# Patient Record
Sex: FEMALE | Race: WHITE | NOT HISPANIC OR LATINO | Employment: PART TIME | URBAN - NONMETROPOLITAN AREA
[De-identification: names, ages, dates, MRNs, and addresses within clinical notes are randomized per-mention and may not be internally consistent; named-entity substitution may affect disease eponyms.]

---

## 2023-01-31 ENCOUNTER — OFFICE VISIT (OUTPATIENT)
Dept: FAMILY MEDICINE CLINIC | Facility: CLINIC | Age: 18
End: 2023-01-31
Payer: COMMERCIAL

## 2023-01-31 VITALS
HEART RATE: 78 BPM | DIASTOLIC BLOOD PRESSURE: 68 MMHG | WEIGHT: 137 LBS | OXYGEN SATURATION: 99 % | SYSTOLIC BLOOD PRESSURE: 110 MMHG | HEIGHT: 66 IN | BODY MASS INDEX: 22.02 KG/M2 | TEMPERATURE: 97.8 F

## 2023-01-31 DIAGNOSIS — R10.13 EPIGASTRIC PAIN: ICD-10-CM

## 2023-01-31 DIAGNOSIS — Z87.440 HISTORY OF RECURRENT UTI (URINARY TRACT INFECTION): ICD-10-CM

## 2023-01-31 DIAGNOSIS — F41.9 ANXIETY: ICD-10-CM

## 2023-01-31 DIAGNOSIS — K59.04 CHRONIC IDIOPATHIC CONSTIPATION: Primary | ICD-10-CM

## 2023-01-31 DIAGNOSIS — Z78.9 ELECTRONIC CIGARETTE USE: ICD-10-CM

## 2023-01-31 PROCEDURE — 99204 OFFICE O/P NEW MOD 45 MIN: CPT | Performed by: FAMILY MEDICINE

## 2023-01-31 RX ORDER — VILAZODONE HYDROCHLORIDE 20 MG/1
TABLET ORAL
COMMUNITY
Start: 2023-01-13 | End: 2023-01-31 | Stop reason: SDUPTHER

## 2023-01-31 RX ORDER — NORETHINDRONE ACETATE AND ETHINYL ESTRADIOL, AND FERROUS FUMARATE 1MG-20(24)
1 KIT ORAL DAILY
COMMUNITY
Start: 2023-01-09 | End: 2023-02-09 | Stop reason: SDUPTHER

## 2023-01-31 RX ORDER — OMEPRAZOLE 20 MG/1
20 CAPSULE, DELAYED RELEASE ORAL DAILY
COMMUNITY
End: 2023-01-31 | Stop reason: SDUPTHER

## 2023-01-31 RX ORDER — VILAZODONE HYDROCHLORIDE 10 MG/1
TABLET ORAL
COMMUNITY
Start: 2022-10-28 | End: 2023-01-31 | Stop reason: SDUPTHER

## 2023-01-31 RX ORDER — POLYETHYLENE GLYCOL 3350 17 G/17G
17 POWDER, FOR SOLUTION ORAL DAILY
Qty: 850 G | Refills: 11 | Status: SHIPPED | OUTPATIENT
Start: 2023-01-31

## 2023-01-31 RX ORDER — VILAZODONE HYDROCHLORIDE 20 MG/1
20 TABLET ORAL DAILY
Qty: 30 TABLET | Refills: 11
Start: 2023-01-31

## 2023-01-31 RX ORDER — OMEPRAZOLE 40 MG/1
40 CAPSULE, DELAYED RELEASE ORAL 2 TIMES DAILY
Qty: 60 CAPSULE | Refills: 3 | Status: SHIPPED | OUTPATIENT
Start: 2023-01-31 | End: 2023-02-02 | Stop reason: CLARIF

## 2023-01-31 NOTE — PROGRESS NOTES
Follow Up Office Visit      Date: 2023   Patient Name: Janice Landaverde  : 2005   MRN: [id number]     Chief Complaint:    Chief Complaint   Patient presents with   • Abdominal Pain     Upper abdomen   • Nausea   • Constipation   • Establish Care       History of Present Illness: Janice Landaverde is a 17 y.o. female who is here today for complaint of having abdominal pain.  Patient states that she is having problems with epigastric abdominal pain that is preventing her from eating.  She has had some problems with vomiting in the past but this was induced to help with the sensation of her epigastric pain.  The pain does become worse several hours after she eats but is not associated with any right upper quadrant pain or difficulty with her right shoulder blade pain.  Patient has had problems with frequent urinary tract infections in the past.  She also has a history of having constipation in which she was taking MiraLAX in the past.  Patient had a good bowel movement this morning that was hard.  It did not necessarily decrease her epigastric symptomatology.  She has no heartburn symptoms.  She does not have pain that radiates to her back.  She has not had any blood in her stool or in her emesis.  She has tried over-the-counter acid reducers with minimal success.  No other issues have been no at present time except for patient being seen by gynecologist for frequent urinary tract infections.  He does otherwise continue with normal activity, appetite and sleep up to this point.  Since she has had the abdominal pain her sleep has been disrupted and her appetite and activity have been diminished.  She has no other cardiovascular, respiratory, gastrointestinal, urologic or neurologic symptomatology.  Patient has no gynecologic symptoms.  Her periods have been doing well as she has no pelvic complaints.    Subjective      Review of Systems:   Review of Systems   Constitutional: Negative for activity change,  appetite change and fatigue.   Respiratory: Negative for cough and shortness of breath.    Cardiovascular: Negative for chest pain, palpitations and leg swelling.   Gastrointestinal: Positive for abdominal pain and constipation. Negative for blood in stool, diarrhea, nausea and vomiting.   Genitourinary: Negative for dysuria, flank pain, frequency and urgency.   Neurological: Negative for dizziness, weakness and memory problem.   Psychiatric/Behavioral: Positive for sleep disturbance.       I have reviewed the patients family history, social history, past medical history, past surgical history and have updated it as appropriate.     Medications:     Current Outpatient Medications:   •  Gemmily 1-20 MG-MCG(24) capsule, Take 1 capsule by mouth Daily., Disp: , Rfl:   •  omeprazole (priLOSEC) 40 MG capsule, Take 1 capsule by mouth 2 (Two) Times a Day. PRN, Disp: 60 capsule, Rfl: 3  •  vilazodone (VIIBRYD) 20 MG tablet tablet, Take 1 tablet by mouth Daily., Disp: 30 tablet, Rfl: 11  •  polyethylene glycol (MiraLax) 17 GM/SCOOP powder, Take 17 g by mouth Daily., Disp: 850 g, Rfl: 11    Allergies:   No Known Allergies    Immunizations:   Immunization History   Administered Date(s) Administered   • DTaP, Unspecified 2003, 2003, 03/01/2004, 12/02/2004, 05/21/2008   • Flu Vaccine Quad PF 6-35MO 12/09/2009   • Hep A, 2 Dose 08/04/2014, 08/07/2018   • Hep B, Adolescent or Pediatric 2003, 2003, 2003, 03/01/2004   • Hib (HbOC) 2003, 2003, 08/17/2004   • Hpv9 08/07/2018, 10/16/2019   • IPV 2003, 2003, 03/01/2004, 05/21/2008   • MMR 12/02/2004, 05/21/2008   • Meningococcal Conjugate 08/04/2014   • Meningococcal MCV4P (Menactra) 10/16/2019   • Pneumococcal Conjugate 13-Valent (PCV13) 2003, 2003, 03/01/2004, 12/02/2004   • Tdap 08/04/2014, 08/07/2018   • Varicella 08/17/2004, 08/04/2014        Objective     Physical Exam: Please see above  Vital Signs:   Vitals:     "01/31/23 1354   BP: 110/68   BP Location: Left arm   Patient Position: Sitting   Cuff Size: Adult   Pulse: 78   Temp: 97.8 °F (36.6 °C)   SpO2: 99%   Weight: 62.1 kg (137 lb)   Height: 167.6 cm (66\")   PainSc:   4   PainLoc: Abdomen     Body mass index is 22.11 kg/m².  BMI is within normal parameters. No other follow-up for BMI required.       Physical Exam  Vitals and nursing note reviewed.   Constitutional:       Appearance: Normal appearance.   HENT:      Head: Normocephalic and atraumatic.      Nose: Nose normal.      Mouth/Throat:      Pharynx: Oropharynx is clear.   Eyes:      Extraocular Movements: Extraocular movements intact.      Pupils: Pupils are equal, round, and reactive to light.   Neck:      Thyroid: No thyroid mass or thyromegaly.      Trachea: Trachea normal.   Cardiovascular:      Rate and Rhythm: Normal rate and regular rhythm.      Pulses: Normal pulses. No decreased pulses.      Heart sounds: Normal heart sounds.   Pulmonary:      Effort: Pulmonary effort is normal.      Breath sounds: Normal breath sounds.   Abdominal:      General: Abdomen is flat. Bowel sounds are normal.      Palpations: Abdomen is soft. There is no hepatomegaly, splenomegaly, mass or pulsatile mass.      Tenderness: There is no abdominal tenderness. There is no right CVA tenderness, left CVA tenderness, guarding or rebound. Negative signs include Perkins's sign and McBurney's sign.      Hernia: No hernia is present.      Comments: Patient had dullness to percussion up to her splenic flexure.   Musculoskeletal:      Cervical back: Neck supple.      Right lower leg: No edema.      Left lower leg: No edema.   Lymphadenopathy:      Cervical: No cervical adenopathy.   Skin:     General: Skin is warm and dry.   Neurological:      General: No focal deficit present.      Mental Status: She is alert and oriented to person, place, and time.      Sensory: Sensation is intact.      Motor: Motor function is intact.      Coordination: " Coordination is intact.   Psychiatric:         Attention and Perception: Attention normal.         Mood and Affect: Mood normal.         Speech: Speech normal.         Behavior: Behavior normal.         Procedures    Results:   Labs:   No results found for: HGBA1C, CMP, CBCDIFFPANEL, CREAT, TSH     POCT Results (if applicable):   No results found for this or any previous visit.    Imaging:   No valid procedures specified.     Measures:   Advanced Care Planning:   Did not discuss.    Smoking Cessation:   Patient does not smoke but does use electronic cigarettes.    Assessment / Plan      Assessment/Plan:   Diagnoses and all orders for this visit:    1. Chronic idiopathic constipation (Primary)   Patient's exam is more consistent with constipation.  I do suspect that her pain could be a result of having constipation.  She does have a history of having constipation in the past and did use MiraLAX at that time.  We will restart her on MiraLAX and reassess in 2 weeks time.  If symptoms do persist it may be necessary for us to pursue with further evaluation and treatment with even a possible colonoscopy.  -     polyethylene glycol (MiraLax) 17 GM/SCOOP powder; Take 17 g by mouth Daily.  Dispense: 850 g; Refill: 11    2. Epigastric pain   Patient has epigastric pain I do believe is a result of constipation.  She does not have findings consistent with gallbladder disease or gastritis.  But nonetheless that she has had some reflux symptoms and worsening symptoms at bedtime we will go ahead and try her on a PPI twice daily just to assure as there is no underlying abnormality.  If she develops alarm symptoms that may be necessary for us to further pursue.  If her symptoms persist despite treatment we may even assess for afebrile H. pylori infection or celiac disease.  -     omeprazole (priLOSEC) 40 MG capsule; Take 1 capsule by mouth 2 (Two) Times a Day. PRN  Dispense: 60 capsule; Refill: 3    3. Anxiety   Patient has been  taking Viibryd.  Jennifer Lay OMAR.  She has been doing well with this.  We will continue on her current regimen.  -     vilazodone (VIIBRYD) 20 MG tablet tablet; Take 1 tablet by mouth Daily.  Dispense: 30 tablet; Refill: 11    4. Electronic cigarette use   We did not discuss electronic cigarette usage at this visit.  We will discuss next time advising her to discontinue due to the health complications.    5. History of recurrent UTI (urinary tract infection)   Patient does have a history of recurrent UTIs and was evaluated as a child with a normal VCUG.  I do suspect that she had bowel bladder dysfunction then and probably has it now has causing her symptomatology.  Her urine flow is good and there does not appear to be any hesitancy.  Hopefully we will improve her urinary tract symptoms with treatment of her constipation.  We will address at next visit.      Follow Up:   Return in about 2 weeks (around 2/14/2023) for Recheck.      At [facility], we believe that sharing information builds trust and better relationships. You are receiving this note because you recently visited [facility]. It is possible you will see health information before a provider has talked with you about it. This kind of information can be easy to misunderstand. To help you fully understand what it means for your health, we urge you to discuss this note with your provider.    Cem Acosta MD  [facility]

## 2023-02-01 ENCOUNTER — TELEPHONE (OUTPATIENT)
Dept: FAMILY MEDICINE CLINIC | Facility: CLINIC | Age: 18
End: 2023-02-01
Payer: COMMERCIAL

## 2023-02-01 NOTE — TELEPHONE ENCOUNTER
CALLED [facility] AND NO RECORD FOR THIS PATIENT, THEY ADVISED TO CALL DR MAKENNA GONZALEZ OFFICE , HAD LABS FR0M 2019, BUT WILL NOT RELEASE WITHOUT SIGNED RELEASE

## 2023-02-02 DIAGNOSIS — R10.13 EPIGASTRIC PAIN: Primary | ICD-10-CM

## 2023-02-02 RX ORDER — ESOMEPRAZOLE MAGNESIUM 40 MG/1
40 CAPSULE, DELAYED RELEASE ORAL 2 TIMES DAILY
Qty: 180 CAPSULE | Refills: 0 | Status: SHIPPED | OUTPATIENT
Start: 2023-02-02

## 2023-02-09 RX ORDER — NORETHINDRONE ACETATE AND ETHINYL ESTRADIOL, AND FERROUS FUMARATE 1MG-20(24)
1 KIT ORAL DAILY
Qty: 28 CAPSULE | Refills: 11 | Status: SHIPPED | OUTPATIENT
Start: 2023-02-09

## 2023-02-09 NOTE — TELEPHONE ENCOUNTER
Caller: Janice Landaverde    Relationship: Self    Best call back number:   [phone]     Requested Prescriptions:   Requested Prescriptions     Pending Prescriptions Disp Refills   • Gemmily 1-20 MG-MCG(24) capsule 28 capsule      Sig: Take 1 capsule by mouth Daily.      Pharmacy where request should be sent:  [facility]     Additional details provided by patient:  PATIENT IS OUT OF THE MEDICATION     Does the patient have less than a 3 day supply:  [x] Yes  [] No    Would you like a call back once the refill request has been completed: [] Yes [x] No    If the office needs to give you a call back, can they leave a voicemail: [] Yes [x] No

## 2023-02-15 ENCOUNTER — OFFICE VISIT (OUTPATIENT)
Dept: FAMILY MEDICINE CLINIC | Facility: CLINIC | Age: 18
End: 2023-02-15
Payer: COMMERCIAL

## 2023-02-15 VITALS
BODY MASS INDEX: 22.6 KG/M2 | SYSTOLIC BLOOD PRESSURE: 102 MMHG | DIASTOLIC BLOOD PRESSURE: 60 MMHG | RESPIRATION RATE: 16 BRPM | OXYGEN SATURATION: 99 % | WEIGHT: 140 LBS | HEART RATE: 71 BPM | TEMPERATURE: 98 F

## 2023-02-15 DIAGNOSIS — K59.04 CHRONIC IDIOPATHIC CONSTIPATION: Primary | ICD-10-CM

## 2023-02-15 DIAGNOSIS — Z23 NEED FOR INFLUENZA VACCINATION: ICD-10-CM

## 2023-02-15 DIAGNOSIS — F41.9 ANXIETY: ICD-10-CM

## 2023-02-15 DIAGNOSIS — Z23 NEED FOR HPV VACCINATION: ICD-10-CM

## 2023-02-15 DIAGNOSIS — Z78.9 ELECTRONIC CIGARETTE USE: ICD-10-CM

## 2023-02-15 PROCEDURE — 99213 OFFICE O/P EST LOW 20 MIN: CPT | Performed by: FAMILY MEDICINE

## 2023-02-15 NOTE — PROGRESS NOTES
Follow Up Office Visit      Date: 02/15/2023   Patient Name: Janice Landaverde  : 2005   MRN: [id number]     Chief Complaint:    Chief Complaint   Patient presents with   • Abdominal Pain     Fu with abd pain/constipation     pt states she did not  your acid reducer med yet       History of Present Illness: Janice Landaverde is a 17 y.o. female who is here today for follow-up.  Patient is doing much better since last being seen.  Her constipation has resolved and that she has had an improvement in her abdominal pain.  Her bladder symptoms are doing well at present time.  Patient does continue to take her birth control without issues.  She does have a scheduled follow-up with the gynecologist in May.  She has not had to take any acid reducers as her reflux symptoms have also improved.  No other issues have been noted.  Patient denies change in activity, appetite or sleep.  She has otherwise been doing well.    Subjective      Review of Systems:   Review of Systems   Constitutional: Negative for activity change, appetite change and fatigue.   Respiratory: Negative for cough and shortness of breath.    Cardiovascular: Negative for chest pain, palpitations and leg swelling.   Gastrointestinal: Negative for abdominal distention, abdominal pain, blood in stool, constipation, diarrhea, nausea and vomiting.   Genitourinary: Negative for dysuria, flank pain, frequency and urgency.   Neurological: Negative for dizziness, weakness and memory problem.   Psychiatric/Behavioral: Negative for sleep disturbance.       I have reviewed the patients family history, social history, past medical history, past surgical history and have updated it as appropriate.     Medications:     Current Outpatient Medications:   •  esomeprazole (nexIUM) 40 MG capsule, Take 1 capsule by mouth 2 (Two) Times a Day., Disp: 180 capsule, Rfl: 0  •  Gemmily 1-20 MG-MCG(24) capsule, Take 1 capsule by mouth Daily., Disp: 28 capsule, Rfl: 11  •   polyethylene glycol (MiraLax) 17 GM/SCOOP powder, Take 17 g by mouth Daily., Disp: 850 g, Rfl: 11  •  vilazodone (VIIBRYD) 20 MG tablet tablet, Take 1 tablet by mouth Daily., Disp: 30 tablet, Rfl: 11    Allergies:   No Known Allergies    Immunizations:   Immunization History   Administered Date(s) Administered   • DTaP, Unspecified 2003, 2003, 03/01/2004, 12/02/2004, 05/21/2008   • Flu Vaccine Quad PF 6-35MO 12/09/2009   • Hep A, 2 Dose 08/04/2014, 08/07/2018   • Hep B, Adolescent or Pediatric 2003, 2003, 2003, 03/01/2004   • Hib (HbOC) 2003, 2003, 08/17/2004   • Hpv9 08/07/2018, 10/16/2019   • IPV 2003, 2003, 03/01/2004, 05/21/2008   • MMR 12/02/2004, 05/21/2008   • Meningococcal Conjugate 08/04/2014   • Meningococcal MCV4P (Menactra) 10/16/2019   • Pneumococcal Conjugate 13-Valent (PCV13) 2003, 2003, 03/01/2004, 12/02/2004   • Tdap 08/04/2014, 08/07/2018   • Varicella 08/17/2004, 08/04/2014        Objective     Physical Exam: Please see above  Vital Signs:   Vitals:    02/15/23 1110   BP: 102/60   BP Location: Left arm   Patient Position: Sitting   Cuff Size: Adult   Pulse: 71   Resp: 16   Temp: 98 °F (36.7 °C)   SpO2: 99%   Weight: 63.5 kg (140 lb)     Body mass index is 22.6 kg/m².  BMI is within normal parameters. No other follow-up for BMI required.       Physical Exam  Vitals and nursing note reviewed.   Constitutional:       Appearance: Normal appearance.   HENT:      Head: Normocephalic and atraumatic.      Nose: Nose normal.      Mouth/Throat:      Pharynx: Oropharynx is clear.   Eyes:      Extraocular Movements: Extraocular movements intact.      Pupils: Pupils are equal, round, and reactive to light.   Neck:      Thyroid: No thyroid mass or thyromegaly.      Trachea: Trachea normal.   Cardiovascular:      Rate and Rhythm: Normal rate and regular rhythm.      Pulses: Normal pulses. No decreased pulses.      Heart sounds: Normal heart  sounds.   Pulmonary:      Effort: Pulmonary effort is normal.      Breath sounds: Normal breath sounds.   Abdominal:      General: Abdomen is flat. Bowel sounds are normal.      Palpations: Abdomen is soft.      Tenderness: There is no abdominal tenderness.   Musculoskeletal:      Cervical back: Neck supple.      Right lower leg: No edema.      Left lower leg: No edema.   Lymphadenopathy:      Cervical: No cervical adenopathy.   Skin:     General: Skin is warm and dry.   Neurological:      General: No focal deficit present.      Mental Status: She is alert and oriented to person, place, and time.      Sensory: Sensation is intact.      Motor: Motor function is intact.      Coordination: Coordination is intact.   Psychiatric:         Attention and Perception: Attention normal.         Mood and Affect: Mood normal.         Speech: Speech normal.         Behavior: Behavior normal.         Procedures    Results:   Labs:   No results found for: HGBA1C, CMP, CBCDIFFPANEL, CREAT, TSH     POCT Results (if applicable):   No results found for this or any previous visit.    Imaging:   No valid procedures specified.     Measures:   Advanced Care Planning:   Did not discuss.    Smoking Cessation:   Non-smoker but uses an electronic cigarette.    Assessment / Plan      Assessment/Plan:   Diagnoses and all orders for this visit:    1. Chronic idiopathic constipation (Primary)   Patient's abdominal pain has improved that her abdominal exam was benign with improvement in percussion with treatment.  We will continue with MiraLAX at present time as it does appear to be beneficial.  We did encourage her to understand that this is not a short-term treatment but more of a lifelong treatment.  She will continue to push fluids and monitor and we will reassess in 6 months time.    2. Electronic cigarette use   Patient does not smoke cigarettes but does use electronic cigarette usage.  We have discussed the risk and benefits and have  encouraged her to discontinue.    3. Need for influenza vaccination   Patient does not wish to have the flu shot at present time.    4. Need for HPV vaccination   Patient did have 2 HPV vaccines but this was after she was 13 years of age.  She needs to have a third 1.  She will discuss this with her gynecologist when she has an appointment in May.    5. Anxiety   Patient has been doing well with anxiety.  We will not change her Viibryd at present time.        Follow Up:   Return in about 6 months (around 8/15/2023) for Annual physical.      At [facility], we believe that sharing information builds trust and better relationships. You are receiving this note because you recently visited [facility]. It is possible you will see health information before a provider has talked with you about it. This kind of information can be easy to misunderstand. To help you fully understand what it means for your health, we urge you to discuss this note with your provider.    Cem Acosta MD  [facility]